# Patient Record
Sex: MALE | Race: WHITE | NOT HISPANIC OR LATINO | Employment: OTHER | URBAN - METROPOLITAN AREA
[De-identification: names, ages, dates, MRNs, and addresses within clinical notes are randomized per-mention and may not be internally consistent; named-entity substitution may affect disease eponyms.]

---

## 2024-01-25 ENCOUNTER — OFFICE VISIT (OUTPATIENT)
Dept: URGENT CARE | Facility: CLINIC | Age: 89
End: 2024-01-25

## 2024-01-25 VITALS
TEMPERATURE: 98.1 F | DIASTOLIC BLOOD PRESSURE: 100 MMHG | HEART RATE: 104 BPM | WEIGHT: 200 LBS | RESPIRATION RATE: 22 BRPM | OXYGEN SATURATION: 98 % | SYSTOLIC BLOOD PRESSURE: 176 MMHG

## 2024-01-25 DIAGNOSIS — L73.9 FOLLICULITIS: Primary | ICD-10-CM

## 2024-01-25 PROCEDURE — 99213 OFFICE O/P EST LOW 20 MIN: CPT | Performed by: PHYSICIAN ASSISTANT

## 2024-01-25 RX ORDER — CEPHALEXIN 250 MG/1
250 CAPSULE ORAL EVERY 6 HOURS SCHEDULED
Qty: 28 CAPSULE | Refills: 0 | Status: SHIPPED | OUTPATIENT
Start: 2024-01-25 | End: 2024-02-01

## 2024-01-25 NOTE — PROGRESS NOTES
[facility]        NAME: Clarke Angel is a 89 y.o. male  : 1932    MRN: [id number]  DATE: 2024  TIME: 11:52 AM    Assessment and Plan   Folliculitis [L73.9]  1. Folliculitis  cephalexin (KEFLEX) 250 mg capsule            Patient Instructions       Follow up with PCP in 3-5 days.  Proceed to  ER if symptoms worsen.    Chief Complaint     Chief Complaint   Patient presents with   • Rash     Patient has rash on his back that started about a couple months ago, he states he lost track of time. Rash is itchy, not painful          History of Present Illness       Patient presents with a rash on his back for couple months.  States the rash is itchy.  Denies fevers or any other complaints    Rash  Pertinent negatives include no fatigue or fever.       Review of Systems   Review of Systems   Constitutional:  Negative for fatigue and fever.   Skin:  Positive for rash.         Current Medications       Current Outpatient Medications:   •  cephalexin (KEFLEX) 250 mg capsule, Take 1 capsule (250 mg total) by mouth every 6 (six) hours for 7 days, Disp: 28 capsule, Rfl: 0    Current Allergies     Allergies as of 2024   • (No Known Allergies)            The following portions of the patient's history were reviewed and updated as appropriate: allergies, current medications, past family history, past medical history, past social history, past surgical history and problem list.     No past medical history on file.    No past surgical history on file.    No family history on file.      Medications have been verified.        Objective   BP (!) 176/100   Pulse 104   Temp 98.1 °F (36.7 °C)   Resp 22   Wt 90.7 kg (200 lb)   SpO2 98%   No LMP for male patient.       Physical Exam     Physical Exam  Constitutional:       Appearance: Normal appearance.   HENT:      Right Ear: Tympanic membrane, ear canal and external ear normal. There is no impacted cerumen.      Left Ear: Tympanic membrane, ear canal  and external ear normal. There is no impacted cerumen.   Abdominal:      General: Abdomen is flat.   Skin:     Findings: Rash present.      Comments: Erythematous papular rash densely scattered over the back and loosely over the chest.  No excoriations, linear appearing rashes, vesicles, scaling or discharge.   Neurological:      Mental Status: He is alert.